# Patient Record
(demographics unavailable — no encounter records)

---

## 2025-03-07 NOTE — ASSESSMENT
[FreeTextEntry1] : obesity doing well  working on weight loss with ozempic ekg ok check labs bp stable due for mammogram sono colonscopy ok last year mom had colon cancer

## 2025-03-07 NOTE — HEALTH RISK ASSESSMENT
[Excellent] : ~his/her~  mood as  excellent [No falls in past year] : Patient reported no falls in the past year [Little interest or pleasure doing things] : 1) Little interest or pleasure doing things [Feeling down, depressed, or hopeless] : 2) Feeling down, depressed, or hopeless [0] : 2) Feeling down, depressed, or hopeless: Not at all (0) [PHQ-2 Negative - No further assessment needed] : PHQ-2 Negative - No further assessment needed [[id number]] : 0 [No] : Did not review medication list for presence of high-risk medications. [Never] : Never [NO] : No [HIV test declined] : HIV test declined [Hepatitis C test declined] : Hepatitis C test declined [Change in mental status noted] : No change in mental status noted [Language] : denies difficulty with language [Behavior] : denies difficulty with behavior [Learning/Retaining New Information] : denies difficulty learning/retaining new information [Handling Complex Tasks] : denies difficulty handling complex tasks [Reasoning] : denies difficulty with reasoning [Spatial Ability and Orientation] : denies difficulty with spatial ability and orientation

## 2025-03-07 NOTE — HISTORY OF PRESENT ILLNESS
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

## 2025-03-24 NOTE — HISTORY OF PRESENT ILLNESS
[de-identified] : 48yo F with PMHX of HTN referred by Dr. Cabezas for evaluation of microcytic anemia  Colonoscopy performed on 3/8/24 was WNL and due again in 3 years.   CBC-3/7/25-WBC 4.62, Hb/Hct 8.4/28.9, MCV 71.2, PLTS 423, Neut % 41.4 L, LYMP% 51.1 H  Hb/Hct 10/12/23 was 9.7/31.7, MCV 88.1, PLTS 368    Patient endorses heavy menses, following with GYN, pending endometrial biopsy. Also has history of Angel-en-Y gastric bypass. On liquid iron without improvement. No previous IV iron or blood transfusions.

## 2025-03-24 NOTE — ASSESSMENT
[FreeTextEntry1] : 50yo F with PMHX of HTN referred by Dr. Cabezas for evaluation of microcytic anemia  #Iron Deficiency Anemia  - Likely secondary to chronic blood loss from menses, poor absorption following gastric bypass - CBC, Iron panel, Ferritin, B12/Folate drawn today   - Due to worsening anemia despite oral iron and hx of bariatric surgery, will schedule for IV iron. Consent performed - Patient to follow-up with GYN for endometrial biopsy

## 2025-04-24 NOTE — HISTORY OF PRESENT ILLNESS
[de-identified] : 48yo F with PMHX of HTN referred by Dr. Cabezas for evaluation of microcytic anemia  Colonoscopy performed on 3/8/24 was WNL and due again in 3 years.   CBC-3/7/25-WBC 4.62, Hb/Hct 8.4/28.9, MCV 71.2, PLTS 423, Neut % 41.4 L, LYMP% 51.1 H  Hb/Hct 10/12/23 was 9.7/31.7, MCV 88.1, PLTS 368    Patient endorses heavy menses, following with GYN, pending endometrial biopsy. Also has history of Angel-en-Y gastric bypass. On liquid iron without improvement. No previous IV iron or blood transfusions.   Interval History: S/p IV FeraHeme x2 March-April 2025. Has heavy menses still. No other bleeding.

## 2025-04-24 NOTE — ASSESSMENT
[FreeTextEntry1] : 48yo F with PMHX of HTN referred by Dr. Cabezas for evaluation of microcytic anemia  #Iron Deficiency Anemia  - Likely secondary to chronic blood loss from menses, poor absorption following gastric bypass - CBC, Iron panel, Ferritin, B12/Folate drawn today   - S/p IV FeraHeme x2 March-April 2025 - Patient to follow-up with GYN for endometrial biopsy

## 2025-07-22 NOTE — ASSESSMENT
[FreeTextEntry1] : 48yo F with PMHX of HTN, gastric bypass referred by Dr. Cabezas presents for follow up of microcytic anemia  #Iron Deficiency Anemia - Likely secondary to chronic blood loss from menses, poor absorption following gastric bypass - S/p IV FeraHeme x2 March-April 2025 -Hgb today- -Sending CMP, Iron studies, B12/Folate, VWF panel -Will call pt with results and follow up plan  - Patient to follow-up with GYN for endometrial biopsy.

## 2025-07-22 NOTE — HISTORY OF PRESENT ILLNESS
[de-identified] : 50yo F with PMHX of HTN, gastric bypass referred by Dr. Cabezas presents for evaluation of microcytic anemia  Colonoscopy performed on 3/8/24 was WNL and due again in 3 years.  CBC-3/7/25-WBC 4.62, Hb/Hct 8.4/28.9, MCV 71.2, PLTS 423, Neut % 41.4 L, LYMP% 51.1 H  Hb/Hct 10/12/23 was 9.7/31.7, MCV 88.1, PLTS 368  Patient endorses heavy menses, following with GYN, pending endometrial biopsy. Also has history of Angel-en-Y gastric bypass. On liquid iron without improvement. No previous IV iron or blood transfusions.  Interval History: S/p IV FeraHeme x2 March-April 2025. Has heavy menses still. No other bleeding.  Interval history 7/22/25: Hgb on 4/23/25 was 10.2 up from 6.9 in March 2025. Menses are ? GYN?